# Patient Record
Sex: FEMALE | Race: WHITE | NOT HISPANIC OR LATINO | URBAN - METROPOLITAN AREA
[De-identification: names, ages, dates, MRNs, and addresses within clinical notes are randomized per-mention and may not be internally consistent; named-entity substitution may affect disease eponyms.]

---

## 2023-11-21 ENCOUNTER — APPOINTMENT (RX ONLY)
Dept: URBAN - METROPOLITAN AREA CLINIC 167 | Facility: CLINIC | Age: 58
Setting detail: DERMATOLOGY
End: 2023-11-21

## 2023-11-21 DIAGNOSIS — Z41.9 ENCOUNTER FOR PROCEDURE FOR PURPOSES OTHER THAN REMEDYING HEALTH STATE, UNSPECIFIED: ICD-10-CM

## 2023-11-21 PROCEDURE — ? COSMETIC CONSULTATION: BOTOX

## 2023-11-21 PROCEDURE — ? PRESCRIPTION

## 2023-11-21 PROCEDURE — ? PHOTO-DOCUMENTATION

## 2023-11-21 PROCEDURE — ? BOTOX

## 2023-11-21 RX ORDER — PHARMACY COMPOUNDING ACCESSORY
EACH MISCELLANEOUS
Qty: 30 | Refills: 3 | Status: ERX | COMMUNITY
Start: 2023-11-21

## 2023-11-21 RX ADMIN — Medication: at 00:00

## 2023-11-21 NOTE — PROCEDURE: BOTOX
Show Right And Left Pupillary Line Units: No
Levator Labii Superioris Units: 0
Show Lateral Platysmal Band Units: Yes
Consent: Written consent obtained. Risks include but not limited to lid/brow ptosis, bruising, swelling, diplopia, temporary effect, incomplete chemical denervation.
Expiration Date (Month Year): 11/25
Forehead Units: 14
Dilution (U/0.1 Cc): 5
Post-Care Instructions: Patient instructed to not lie down for 4 hours and limit physical activity for 24 hours.
Price (Use Numbers Only, No Special Characters Or $): 092
Detail Level: Detailed
Glabellar Complex Units: 20
Lot #: [id number]
Map Statement: Please see attached map for locations and injection amounts.
Post-Care Instructions: Patient instructed to not lie down for 4 hours and limit physical activity for 24 hours. Patient instructed not to massage or rub over the treated areas for the next 24 hours. Patient aware to expect onset of  Botox to be evident within 5-7 days. Patient to contact office with any questions or concerns about results of treatment.
Incrementing Botox Units: By 0.5 Units

## 2023-11-27 RX ORDER — PHARMACY COMPOUNDING ACCESSORY
EACH MISCELLANEOUS
Qty: 30 | Refills: 3 | Status: ERX

## 2023-12-12 ENCOUNTER — APPOINTMENT (RX ONLY)
Dept: URBAN - METROPOLITAN AREA CLINIC 167 | Facility: CLINIC | Age: 58
Setting detail: DERMATOLOGY
End: 2023-12-12

## 2023-12-12 DIAGNOSIS — Z41.9 ENCOUNTER FOR PROCEDURE FOR PURPOSES OTHER THAN REMEDYING HEALTH STATE, UNSPECIFIED: ICD-10-CM

## 2023-12-12 PROCEDURE — ? COSMETIC NEUROTOXINS TOUCH-UP

## 2023-12-12 ASSESSMENT — LOCATION DETAILED DESCRIPTION DERM
LOCATION DETAILED: RIGHT INFERIOR MEDIAL FOREHEAD
LOCATION DETAILED: LEFT INFERIOR MEDIAL FOREHEAD

## 2023-12-12 ASSESSMENT — LOCATION SIMPLE DESCRIPTION DERM
LOCATION SIMPLE: LEFT FOREHEAD
LOCATION SIMPLE: RIGHT FOREHEAD

## 2023-12-12 ASSESSMENT — LOCATION ZONE DERM: LOCATION ZONE: FACE

## 2023-12-12 NOTE — PROCEDURE: COSMETIC NEUROTOXINS TOUCH-UP
Expiration Date (Month Year): 05/26
Consent: Written consent was obtained prior to the procedure. Risks, benefits, expectations and alternatives were discussed including, but not limited to, infection, bleeding, lid/brow ptosis, bruising, swelling, diplopia, temporary effects, incomplete chemical denervation and dissatisfaction with the cosmetic outcome. No guarantee or warranty was given or implied regarding longevity of results.
Cc Per 'dot': 0.05
Reconstitution Date: 12/12/2023
Lot #: [id number]
Patient Satisfaction: Pleased
Price Per Unit In $ (Use Numbers Only, No Special Characters Or $): 0
Postcare Instructions: Patient instructed to not lie down for 4 hours and limit physical activity for 24 hours.
Saline Added To Vial (Cc): 2
Neurotoxin: Botox Cosmetic
Vial Size (In U): 100
Detail Level: Detailed
Global Improvement: Good

## 2024-04-23 ENCOUNTER — APPOINTMENT (RX ONLY)
Dept: URBAN - METROPOLITAN AREA CLINIC 167 | Facility: CLINIC | Age: 59
Setting detail: DERMATOLOGY
End: 2024-04-23

## 2024-04-23 DIAGNOSIS — Z41.9 ENCOUNTER FOR PROCEDURE FOR PURPOSES OTHER THAN REMEDYING HEALTH STATE, UNSPECIFIED: ICD-10-CM

## 2024-04-23 PROCEDURE — ? PHOTO-DOCUMENTATION

## 2024-04-23 PROCEDURE — ? BOTOX

## 2024-04-23 PROCEDURE — ? COSMETIC CONSULTATION: BOTOX

## 2024-04-23 NOTE — PROCEDURE: BOTOX
Show Lateral Platysmal Band Units: Yes
Lot #: [id number]
R Brow Units: 0
Detail Level: Detailed
Forehead Units: 6
Dilution (U/0.1 Cc): 5
Consent: Written consent obtained. Risks include but not limited to lid/brow ptosis, bruising, swelling, diplopia, temporary effect, incomplete chemical denervation.
Post-Care Instructions: Patient instructed to not lie down for 4 hours and limit physical activity for 24 hours.
Show Right And Left Brow Units: No
Glabellar Complex Units: 22
Expiration Date (Month Year): 02/26
Price (Use Numbers Only, No Special Characters Or $): 392
Post-Care Instructions: Patient instructed to not lie down for 4 hours and limit physical activity for 24 hours. Patient instructed not to massage or rub over the treated areas for the next 24 hours. Patient aware to expect onset of  Botox to be evident within 5-7 days. Patient to contact office with any questions or concerns about results of treatment.
Map Statement: Please see attached map for locations and injection amounts.
Incrementing Botox Units: By 0.5 Units

## 2024-12-17 ENCOUNTER — APPOINTMENT (OUTPATIENT)
Dept: URBAN - METROPOLITAN AREA CLINIC 167 | Facility: CLINIC | Age: 59
Setting detail: DERMATOLOGY
End: 2024-12-17

## 2024-12-17 DIAGNOSIS — Z41.9 ENCOUNTER FOR PROCEDURE FOR PURPOSES OTHER THAN REMEDYING HEALTH STATE, UNSPECIFIED: ICD-10-CM

## 2024-12-17 PROCEDURE — ? COSMETIC CONSULTATION: BOTOX

## 2024-12-17 PROCEDURE — ? BOTOX

## 2024-12-17 PROCEDURE — ? PHOTO-DOCUMENTATION

## 2024-12-17 NOTE — HPI: COSMETIC (BOTOX)
[id number]: previous_has_had_botox
Patient was called and appointment has been rescheduled.    Inge Rocha, CMA     
When Was Your Last Botox Treatment?: April 2024

## 2024-12-17 NOTE — PROCEDURE: BOTOX
Additional Area 4 Units: 0
Show Lcl Units: No
Detail Level: Detailed
Show Levator Superior Units: Yes
Dilution (U/0.1 Cc): 5
Price (Use Numbers Only, No Special Characters Or $): 282
Consent: Written consent obtained. Risks include but not limited to lid/brow ptosis, bruising, swelling, diplopia, temporary effect, incomplete chemical denervation.
Forehead Units: 8
Expiration Date (Month Year): 11/2026
Glabellar Complex Units: 22
Nasal Root Units: 6
Lot #: [id number]
Post-Care Instructions: Patient instructed to not lie down for 4 hours and limit physical activity for 24 hours.
Post-Care Instructions: Patient instructed to not lie down for 4 hours and limit physical activity for 24 hours. Patient instructed not to massage or rub over the treated areas for the next 24 hours. Patient aware to expect onset of  Botox to be evident within 5-7 days. Patient to contact office with any questions or concerns about results of treatment.
Map Statement: Please see attached map for locations and injection amounts.
Incrementing Botox Units: By 0.5 Units